# Patient Record
Sex: FEMALE | Race: BLACK OR AFRICAN AMERICAN | Employment: FULL TIME | URBAN - METROPOLITAN AREA
[De-identification: names, ages, dates, MRNs, and addresses within clinical notes are randomized per-mention and may not be internally consistent; named-entity substitution may affect disease eponyms.]

---

## 2017-09-06 ENCOUNTER — TRANSFERRED RECORDS (OUTPATIENT)
Dept: HEALTH INFORMATION MANAGEMENT | Facility: CLINIC | Age: 37
End: 2017-09-06

## 2019-12-04 ENCOUNTER — TRANSFERRED RECORDS (OUTPATIENT)
Dept: HEALTH INFORMATION MANAGEMENT | Facility: CLINIC | Age: 39
End: 2019-12-04

## 2019-12-11 ENCOUNTER — TRANSFERRED RECORDS (OUTPATIENT)
Dept: HEALTH INFORMATION MANAGEMENT | Facility: CLINIC | Age: 39
End: 2019-12-11

## 2019-12-11 ENCOUNTER — MEDICAL CORRESPONDENCE (OUTPATIENT)
Dept: HEALTH INFORMATION MANAGEMENT | Facility: CLINIC | Age: 39
End: 2019-12-11

## 2019-12-11 ENCOUNTER — TELEPHONE (OUTPATIENT)
Dept: OBGYN | Facility: CLINIC | Age: 39
End: 2019-12-11

## 2019-12-11 DIAGNOSIS — D64.9 SEVERE ANEMIA: Primary | ICD-10-CM

## 2019-12-11 RX ORDER — DOXYCYCLINE 100 MG/10ML
100 INJECTION, POWDER, LYOPHILIZED, FOR SOLUTION INTRAVENOUS
Status: CANCELLED | OUTPATIENT
Start: 2019-12-11

## 2019-12-11 NOTE — TELEPHONE ENCOUNTER
Received referral for D&E from [facility] 15w4d today. Pt [facility], recommended [facility] based [facility].    Checked insurance, covered benefit on MA, needs medical necessity statement.     Orders placed. 24 hour consent scanned to chart    Called patient to coordinate care. Informed if past 16 weeks will need 2 day procedure. Pt expressed understanding. Desires one day procedure if possible.     Called surgery scheduling. OR time on hold Friday 12/13 at 2:00 (Xiang).    ROuting to Maya to schedule

## 2019-12-12 ENCOUNTER — TELEPHONE (OUTPATIENT)
Dept: OBGYN | Facility: CLINIC | Age: 39
End: 2019-12-12

## 2019-12-12 ENCOUNTER — PREP FOR PROCEDURE (OUTPATIENT)
Dept: OBGYN | Facility: CLINIC | Age: 39
End: 2019-12-12

## 2019-12-12 DIAGNOSIS — Z51.89 ENCOUNTER FOR THERAPEUTIC PROCEDURE: Primary | ICD-10-CM

## 2019-12-12 PROBLEM — D64.9 SEVERE ANEMIA: Status: ACTIVE | Noted: 2019-12-12

## 2019-12-12 RX ORDER — MISOPROSTOL 200 UG/1
400 TABLET ORAL ONCE
Qty: 2 TABLET | Refills: 0 | Status: SHIPPED | OUTPATIENT
Start: 2019-12-12 | End: 2019-12-13

## 2019-12-13 ENCOUNTER — ANESTHESIA (OUTPATIENT)
Dept: SURGERY | Facility: CLINIC | Age: 39
End: 2019-12-13
Payer: COMMERCIAL

## 2019-12-13 ENCOUNTER — HOSPITAL ENCOUNTER (OUTPATIENT)
Facility: CLINIC | Age: 39
Discharge: HOME | End: 2019-12-13
Attending: OBSTETRICS & GYNECOLOGY | Admitting: OBSTETRICS & GYNECOLOGY
Payer: COMMERCIAL

## 2019-12-13 ENCOUNTER — ANESTHESIA EVENT (OUTPATIENT)
Dept: SURGERY | Facility: CLINIC | Age: 39
End: 2019-12-13
Payer: COMMERCIAL

## 2019-12-13 ENCOUNTER — MEDICAL CORRESPONDENCE (OUTPATIENT)
Dept: HEALTH INFORMATION MANAGEMENT | Facility: CLINIC | Age: 39
End: 2019-12-13

## 2019-12-13 VITALS
RESPIRATION RATE: 16 BRPM | BODY MASS INDEX: 25.05 KG/M2 | DIASTOLIC BLOOD PRESSURE: 78 MMHG | SYSTOLIC BLOOD PRESSURE: 123 MMHG | HEART RATE: 77 BPM | HEIGHT: 67 IN | OXYGEN SATURATION: 100 % | WEIGHT: 159.61 LBS | TEMPERATURE: 98.8 F

## 2019-12-13 DIAGNOSIS — D64.9 SEVERE ANEMIA: ICD-10-CM

## 2019-12-13 DIAGNOSIS — R10.2 PELVIC CRAMPING: Primary | ICD-10-CM

## 2019-12-13 PROBLEM — O20.0 THREATENED MISCARRIAGE: Status: ACTIVE | Noted: 2019-12-01

## 2019-12-13 LAB
ABO + RH BLD: NORMAL
ABO + RH BLD: NORMAL
BLD GP AB SCN SERPL QL: NORMAL
BLOOD BANK CMNT PATIENT-IMP: NORMAL
ERYTHROCYTE [DISTWIDTH] IN BLOOD BY AUTOMATED COUNT: 20.7 % (ref 10–15)
HCT VFR BLD AUTO: 31.2 % (ref 35–47)
HGB BLD-MCNC: 9.1 G/DL (ref 11.7–15.7)
MCH RBC QN AUTO: 20.6 PG (ref 26.5–33)
MCHC RBC AUTO-ENTMCNC: 29.2 G/DL (ref 31.5–36.5)
MCV RBC AUTO: 71 FL (ref 78–100)
PLATELET # BLD AUTO: 378 10E9/L (ref 150–450)
RBC # BLD AUTO: 4.41 10E12/L (ref 3.8–5.2)
SPECIMEN EXP DATE BLD: NORMAL
WBC # BLD AUTO: 9.6 10E9/L (ref 4–11)

## 2019-12-13 PROCEDURE — 36000051 ZZH SURGERY LEVEL 2 1ST 30 MIN - UMMC: Performed by: OBSTETRICS & GYNECOLOGY

## 2019-12-13 PROCEDURE — 25800030 ZZH RX IP 258 OP 636: Performed by: OBSTETRICS & GYNECOLOGY

## 2019-12-13 PROCEDURE — 25000128 H RX IP 250 OP 636: Performed by: NURSE ANESTHETIST, CERTIFIED REGISTERED

## 2019-12-13 PROCEDURE — 88305 TISSUE EXAM BY PATHOLOGIST: CPT | Performed by: OBSTETRICS & GYNECOLOGY

## 2019-12-13 PROCEDURE — 88305 TISSUE EXAM BY PATHOLOGIST: CPT | Mod: 26 | Performed by: OBSTETRICS & GYNECOLOGY

## 2019-12-13 PROCEDURE — 37000009 ZZH ANESTHESIA TECHNICAL FEE, EACH ADDTL 15 MIN: Performed by: OBSTETRICS & GYNECOLOGY

## 2019-12-13 PROCEDURE — 25000125 ZZHC RX 250: Performed by: OBSTETRICS & GYNECOLOGY

## 2019-12-13 PROCEDURE — 00000159 ZZHCL STATISTIC H-SEND OUTS PREP: Performed by: OBSTETRICS & GYNECOLOGY

## 2019-12-13 PROCEDURE — 86900 BLOOD TYPING SEROLOGIC ABO: CPT | Performed by: OBSTETRICS & GYNECOLOGY

## 2019-12-13 PROCEDURE — 85027 COMPLETE CBC AUTOMATED: CPT | Performed by: OBSTETRICS & GYNECOLOGY

## 2019-12-13 PROCEDURE — 86850 RBC ANTIBODY SCREEN: CPT | Performed by: OBSTETRICS & GYNECOLOGY

## 2019-12-13 PROCEDURE — 86901 BLOOD TYPING SEROLOGIC RH(D): CPT | Performed by: OBSTETRICS & GYNECOLOGY

## 2019-12-13 PROCEDURE — 25000128 H RX IP 250 OP 636: Performed by: OBSTETRICS & GYNECOLOGY

## 2019-12-13 PROCEDURE — 25000125 ZZHC RX 250: Performed by: NURSE ANESTHETIST, CERTIFIED REGISTERED

## 2019-12-13 PROCEDURE — 36000053 ZZH SURGERY LEVEL 2 EA 15 ADDTL MIN - UMMC: Performed by: OBSTETRICS & GYNECOLOGY

## 2019-12-13 PROCEDURE — 40000170 ZZH STATISTIC PRE-PROCEDURE ASSESSMENT II: Performed by: OBSTETRICS & GYNECOLOGY

## 2019-12-13 PROCEDURE — 71000014 ZZH RECOVERY PHASE 1 LEVEL 2 FIRST HR: Performed by: OBSTETRICS & GYNECOLOGY

## 2019-12-13 PROCEDURE — 37000008 ZZH ANESTHESIA TECHNICAL FEE, 1ST 30 MIN: Performed by: OBSTETRICS & GYNECOLOGY

## 2019-12-13 PROCEDURE — 25000132 ZZH RX MED GY IP 250 OP 250 PS 637: Performed by: ANESTHESIOLOGY

## 2019-12-13 PROCEDURE — 71000027 ZZH RECOVERY PHASE 2 EACH 15 MINS: Performed by: OBSTETRICS & GYNECOLOGY

## 2019-12-13 PROCEDURE — 36415 COLL VENOUS BLD VENIPUNCTURE: CPT | Performed by: OBSTETRICS & GYNECOLOGY

## 2019-12-13 PROCEDURE — 27210794 ZZH OR GENERAL SUPPLY STERILE: Performed by: OBSTETRICS & GYNECOLOGY

## 2019-12-13 PROCEDURE — 25800030 ZZH RX IP 258 OP 636: Performed by: NURSE ANESTHETIST, CERTIFIED REGISTERED

## 2019-12-13 RX ORDER — DEXAMETHASONE SODIUM PHOSPHATE 4 MG/ML
INJECTION, SOLUTION INTRA-ARTICULAR; INTRALESIONAL; INTRAMUSCULAR; INTRAVENOUS; SOFT TISSUE PRN
Status: DISCONTINUED | OUTPATIENT
Start: 2019-12-13 | End: 2019-12-13

## 2019-12-13 RX ORDER — LIDOCAINE HYDROCHLORIDE 20 MG/ML
INJECTION, SOLUTION INFILTRATION; PERINEURAL PRN
Status: DISCONTINUED | OUTPATIENT
Start: 2019-12-13 | End: 2019-12-13

## 2019-12-13 RX ORDER — SODIUM CHLORIDE, SODIUM LACTATE, POTASSIUM CHLORIDE, CALCIUM CHLORIDE 600; 310; 30; 20 MG/100ML; MG/100ML; MG/100ML; MG/100ML
INJECTION, SOLUTION INTRAVENOUS CONTINUOUS
Status: DISCONTINUED | OUTPATIENT
Start: 2019-12-13 | End: 2019-12-13 | Stop reason: HOSPADM

## 2019-12-13 RX ORDER — FENTANYL CITRATE 50 UG/ML
25-50 INJECTION, SOLUTION INTRAMUSCULAR; INTRAVENOUS
Status: DISCONTINUED | OUTPATIENT
Start: 2019-12-13 | End: 2019-12-13 | Stop reason: HOSPADM

## 2019-12-13 RX ORDER — ONDANSETRON 2 MG/ML
4 INJECTION INTRAMUSCULAR; INTRAVENOUS EVERY 30 MIN PRN
Status: DISCONTINUED | OUTPATIENT
Start: 2019-12-13 | End: 2019-12-13 | Stop reason: HOSPADM

## 2019-12-13 RX ORDER — ONDANSETRON 4 MG/1
4 TABLET, ORALLY DISINTEGRATING ORAL
Status: DISCONTINUED | OUTPATIENT
Start: 2019-12-13 | End: 2019-12-13 | Stop reason: HOSPADM

## 2019-12-13 RX ORDER — NALOXONE HYDROCHLORIDE 0.4 MG/ML
.1-.4 INJECTION, SOLUTION INTRAMUSCULAR; INTRAVENOUS; SUBCUTANEOUS
Status: DISCONTINUED | OUTPATIENT
Start: 2019-12-13 | End: 2019-12-13 | Stop reason: HOSPADM

## 2019-12-13 RX ORDER — ACETAMINOPHEN 325 MG/1
650 TABLET ORAL
Status: DISCONTINUED | OUTPATIENT
Start: 2019-12-13 | End: 2019-12-13 | Stop reason: HOSPADM

## 2019-12-13 RX ORDER — ALBUTEROL SULFATE 0.83 MG/ML
2.5 SOLUTION RESPIRATORY (INHALATION) EVERY 4 HOURS PRN
Status: DISCONTINUED | OUTPATIENT
Start: 2019-12-13 | End: 2019-12-13 | Stop reason: HOSPADM

## 2019-12-13 RX ORDER — FENTANYL CITRATE 50 UG/ML
INJECTION, SOLUTION INTRAMUSCULAR; INTRAVENOUS PRN
Status: DISCONTINUED | OUTPATIENT
Start: 2019-12-13 | End: 2019-12-13

## 2019-12-13 RX ORDER — ONDANSETRON 4 MG/1
4 TABLET, ORALLY DISINTEGRATING ORAL EVERY 30 MIN PRN
Status: DISCONTINUED | OUTPATIENT
Start: 2019-12-13 | End: 2019-12-13 | Stop reason: HOSPADM

## 2019-12-13 RX ORDER — ACETAMINOPHEN 325 MG/1
650 TABLET ORAL EVERY 4 HOURS PRN
Qty: 30 TABLET | Refills: 0 | Status: SHIPPED | OUTPATIENT
Start: 2019-12-13

## 2019-12-13 RX ORDER — LIDOCAINE HYDROCHLORIDE 10 MG/ML
INJECTION, SOLUTION INFILTRATION; PERINEURAL PRN
Status: DISCONTINUED | OUTPATIENT
Start: 2019-12-13 | End: 2019-12-13 | Stop reason: HOSPADM

## 2019-12-13 RX ORDER — ONDANSETRON 2 MG/ML
INJECTION INTRAMUSCULAR; INTRAVENOUS PRN
Status: DISCONTINUED | OUTPATIENT
Start: 2019-12-13 | End: 2019-12-13

## 2019-12-13 RX ORDER — PROPOFOL 10 MG/ML
INJECTION, EMULSION INTRAVENOUS CONTINUOUS PRN
Status: DISCONTINUED | OUTPATIENT
Start: 2019-12-13 | End: 2019-12-13

## 2019-12-13 RX ORDER — PROPOFOL 10 MG/ML
INJECTION, EMULSION INTRAVENOUS PRN
Status: DISCONTINUED | OUTPATIENT
Start: 2019-12-13 | End: 2019-12-13

## 2019-12-13 RX ORDER — OXYCODONE HYDROCHLORIDE 5 MG/1
5 TABLET ORAL
Status: DISCONTINUED | OUTPATIENT
Start: 2019-12-13 | End: 2019-12-13 | Stop reason: HOSPADM

## 2019-12-13 RX ORDER — MEPERIDINE HYDROCHLORIDE 25 MG/ML
12.5 INJECTION INTRAMUSCULAR; INTRAVENOUS; SUBCUTANEOUS
Status: DISCONTINUED | OUTPATIENT
Start: 2019-12-13 | End: 2019-12-13 | Stop reason: HOSPADM

## 2019-12-13 RX ORDER — DIMENHYDRINATE 50 MG/ML
25 INJECTION, SOLUTION INTRAMUSCULAR; INTRAVENOUS
Status: DISCONTINUED | OUTPATIENT
Start: 2019-12-13 | End: 2019-12-13 | Stop reason: HOSPADM

## 2019-12-13 RX ORDER — ACETAMINOPHEN 325 MG/1
975 TABLET ORAL ONCE
Status: COMPLETED | OUTPATIENT
Start: 2019-12-13 | End: 2019-12-13

## 2019-12-13 RX ORDER — HYDRALAZINE HYDROCHLORIDE 20 MG/ML
2.5-5 INJECTION INTRAMUSCULAR; INTRAVENOUS EVERY 10 MIN PRN
Status: DISCONTINUED | OUTPATIENT
Start: 2019-12-13 | End: 2019-12-13 | Stop reason: HOSPADM

## 2019-12-13 RX ORDER — DOXYCYCLINE 100 MG/10ML
100 INJECTION, POWDER, LYOPHILIZED, FOR SOLUTION INTRAVENOUS
Status: COMPLETED | OUTPATIENT
Start: 2019-12-13 | End: 2019-12-13

## 2019-12-13 RX ORDER — SODIUM CHLORIDE, SODIUM LACTATE, POTASSIUM CHLORIDE, CALCIUM CHLORIDE 600; 310; 30; 20 MG/100ML; MG/100ML; MG/100ML; MG/100ML
INJECTION, SOLUTION INTRAVENOUS CONTINUOUS PRN
Status: DISCONTINUED | OUTPATIENT
Start: 2019-12-13 | End: 2019-12-13

## 2019-12-13 RX ORDER — METOPROLOL TARTRATE 1 MG/ML
1-2 INJECTION, SOLUTION INTRAVENOUS EVERY 5 MIN PRN
Status: DISCONTINUED | OUTPATIENT
Start: 2019-12-13 | End: 2019-12-13 | Stop reason: HOSPADM

## 2019-12-13 RX ADMIN — ONDANSETRON 4 MG: 2 INJECTION INTRAMUSCULAR; INTRAVENOUS at 14:47

## 2019-12-13 RX ADMIN — FENTANYL CITRATE 50 MCG: 50 INJECTION, SOLUTION INTRAMUSCULAR; INTRAVENOUS at 14:35

## 2019-12-13 RX ADMIN — DEXAMETHASONE SODIUM PHOSPHATE 6 MG: 4 INJECTION, SOLUTION INTRAMUSCULAR; INTRAVENOUS at 14:28

## 2019-12-13 RX ADMIN — DOXYCYCLINE 100 MG: 100 INJECTION, POWDER, LYOPHILIZED, FOR SOLUTION INTRAVENOUS at 14:10

## 2019-12-13 RX ADMIN — SODIUM CHLORIDE, POTASSIUM CHLORIDE, SODIUM LACTATE AND CALCIUM CHLORIDE: 600; 310; 30; 20 INJECTION, SOLUTION INTRAVENOUS at 14:19

## 2019-12-13 RX ADMIN — PROPOFOL 150 MCG/KG/MIN: 10 INJECTION, EMULSION INTRAVENOUS at 14:24

## 2019-12-13 RX ADMIN — FENTANYL CITRATE 50 MCG: 50 INJECTION, SOLUTION INTRAMUSCULAR; INTRAVENOUS at 14:23

## 2019-12-13 RX ADMIN — LIDOCAINE HYDROCHLORIDE 80 MG: 20 INJECTION, SOLUTION INFILTRATION; PERINEURAL at 14:23

## 2019-12-13 RX ADMIN — PROPOFOL 50 MG: 10 INJECTION, EMULSION INTRAVENOUS at 14:23

## 2019-12-13 RX ADMIN — MIDAZOLAM 2 MG: 1 INJECTION INTRAMUSCULAR; INTRAVENOUS at 14:19

## 2019-12-13 RX ADMIN — ACETAMINOPHEN 975 MG: 325 TABLET, FILM COATED ORAL at 16:44

## 2019-12-13 ASSESSMENT — MIFFLIN-ST. JEOR: SCORE: 1436.63

## 2019-12-13 NOTE — DISCHARGE INSTRUCTIONS
Same-Day Surgery   Adult Discharge Orders & Instructions     For 24 hours after surgery:  1. Get plenty of rest.  A responsible adult must stay with you for at least 24 hours after you leave the hospital.   2. Pain medication can slow your reflexes. Do not drive or use heavy equipment.  If you have weakness or tingling, don't drive or use heavy equipment until this feeling goes away.  3. Mixing alcohol and pain medication can cause dizziness and slow your breathing. It can even be fatal. Do not drink alcohol while taking pain medication.  4. Avoid strenuous or risky activities.  Ask for help when climbing stairs.   5. You may feel lightheaded.  If so, sit for a few minutes before standing.  Have someone help you get up.   6. If you have nausea (feel sick to your stomach), drink only clear liquids such as apple juice, ginger ale, broth or 7-Up.  Rest may also help.  Be sure to drink enough fluids.  Move to a regular diet as you feel able. Take pain medications with a small amount of solid food, such as toast or crackers, to avoid nausea.   7. A slight fever is normal. Call the doctor if your fever is over 100 F (37.7 C) (taken under the tongue) or lasts longer than 24 hours.  8. You may have a dry mouth, muscle aches, trouble sleeping or a sore throat.  These symptoms should go away after 24 hours.  9. Do not make important or legal decisions.   Pain Management:      1. Take pain medication (if prescribed) for pain as directed by your physician.        2. WARNING: If the pain medication you have been prescribed contains Tylenol  (acetaminophen), DO NOT take additional doses of Tylenol (acetaminophen).     Call your doctor for any of the followin.  Signs of infection (fever, growing tenderness at the surgery site, severe pain, a large amount of drainage or bleeding, foul-smelling drainage, redness, swelling).    2.  It has been over 8 to 10 hours since surgery and you are still not able to urinate (pee).    3.   Headache for over 24 hours.    4.  Numbness, tingling or weakness the day after surgery (if you had spinal anesthesia).  To contact a doctor, call _____________________________________ or:      [phone] and ask for the Resident On Call for:          ____obstetrics/ OB_____________________ (answered 24 hours a day)      Emergency Department:  [facility]: [phone]  [facility]: [phone]               Rev. 10/2014   Discharge Instructions: Following a Dilation   and Curettage/Dilation and Evacuation    What to expect:    Expect small to moderate amount of vaginal bleeding which should taper off in 4-5 days. It should not be heavier than your regular menstrual flow.    Do not douche, and use a pad rather than tampons.     No intercourse until bleeding has ceased.    Activity:    Rest the day of surgery. You may resume normal activity the next day.    You may bathe or shower.    Avoid heavy lifting (10-15 lbs) for one week.    Comfort:    The amount of discomfort you can expect is very unpredictable. If you have pain that cannot be controlled with non-aspirin pain relievers or with the prescription you may have received, you should notify your doctor.    Abdominal cramping (like menstrual cramps) or low back ache are common and should not be a cause for concern. You will be drowsy and weak the day of surgery and possibly the following day.    Diet:    You have no restrictions on your diet. Following surgery, drink plenty of fluids and eat a light meal.    Nausea:    The anesthesia medications you received during your surgical procedure may produce some nausea.    If you feel nauseated, stay in bed, keep your head down and try drinking fluids such as Seven-Up, tea or soup.    Notify Physician at once if you experience:    A fever over 100 degrees (a low grade fever under 100 degrees is usual after surgery).    Heavy flow and/or passing large clots. Saturating more than 1  pad per hour for 2 or more hours.     Severe pain or cramps.  Rev. 5/12

## 2019-12-13 NOTE — ANESTHESIA CARE TRANSFER NOTE
Patient: Stephanie Bro    Procedure(s):  EXAM UNDER ANESTHESIA, PELVIS  DILATION AND EVACUATION, UTERUS, exam under anesthesia    Diagnosis: Severe anemia [D64.9]  Diagnosis Additional Information: No value filed.    Anesthesia Type:   MAC     Note:  Airway :Face Mask  Patient transferred to:PACU  Handoff Report: Identifed the Patient, Identified the Reponsible Provider, Reviewed the pertinent medical history, Discussed the surgical course, Reviewed Intra-OP anesthesia mangement and issues during anesthesia, Set expectations for post-procedure period and Allowed opportunity for questions and acknowledgement of understanding      Vitals: (Last set prior to Anesthesia Care Transfer)    CRNA VITALS  12/13/2019 1445 - 12/13/2019 1524      12/13/2019             Resp Rate (observed):  14                Electronically Signed By: Antionette Patton CRNA, APRN NATHANAEL  December 13, 2019  3:24 PM

## 2019-12-13 NOTE — OR NURSING
"CN and oncoming nurse notified of patient questioning herself why she did this? And not wanting to go home with \"Juan.\"  Patient is tearful and feeling guilty, stating,\"will ever be forgiven.\"    Patient transferred to private room with door for privacy.  "

## 2019-12-13 NOTE — ANESTHESIA POSTPROCEDURE EVALUATION
Anesthesia POST Procedure Evaluation    Patient: Stephanie Bro   MRN:     [id number] Gender:   female   Age:    39 year old :      1980        Preoperative Diagnosis: Severe anemia [D64.9]   Procedure(s):  EXAM UNDER ANESTHESIA, PELVIS  DILATION AND EVACUATION, UTERUS, exam under anesthesia   Postop Comments: No value filed.       Anesthesia Type:  Not documented  MAC    Reportable Event: NO     PAIN: Uncomplicated   Sign Out status: Comfortable, Well controlled pain     PONV: No PONV   Sign Out status:  No Nausea or Vomiting     Neuro/Psych: Uneventful perioperative course   Sign Out Status: Preoperative baseline; Age appropriate mentation     Airway/Resp.: Uneventful perioperative course   Sign Out Status: Non labored breathing, age appropriate RR; Resp. Status within EXPECTED Parameters     CV: Uneventful perioperative course   Sign Out status: Appropriate BP and perfusion indices; Appropriate HR/Rhythm     Disposition:   Sign Out in:  PACU  Disposition:  Phase II; Home  Recovery Course: Uneventful  Follow-Up: Not required           Last Anesthesia Record Vitals:  CRNA VITALS  2019 1445 - 2019 1545      2019             Resp Rate (observed):  14          Last PACU Vitals:  No vitals data found for the desired time range.        Electronically Signed By: Mita Moran MD, 2019, 3:45 PM

## 2019-12-13 NOTE — OR NURSING
Discussed that Chris will be with patient for the next 24 hours. She feels safe going home with Chris. Patient states does not remember saying she did not want to see Chris.     Dr. Norton in to see patient. Ok to discharge home when patient ready.

## 2019-12-13 NOTE — OR NURSING
Called and spoke with Dr. Norton, notified that patient had a half of a pad of bleeding, then about 30 minutes later had another quarter of a pad of bleeding. It was when patient was up for the first time and up walking to the bathroom. Stated OK to discharge home. Advise patient to return/call if having bleeding of more than one pad an hour for 2 hours.     Patient updated and verbalized understanding.

## 2019-12-13 NOTE — ANESTHESIA PREPROCEDURE EVALUATION
Anesthesia Pre-Procedure Evaluation    Patient: Stephanie Bro   MRN:     [id number] Gender:   female   Age:    39 year old :      1980        Preoperative Diagnosis: Severe anemia [D64.9]   Procedure(s):  EXAM UNDER ANESTHESIA, PELVIS  DILATION AND EVACUATION, UTERUS, exam under anesthesia     Past Medical History:   Diagnosis Date     Anemia, iron deficiency       Past Surgical History:   Procedure Laterality Date     CERCLAGE CERVICAL        SECTION        SECTION            Anesthesia Evaluation     . Pt has had prior anesthetic. Type: Regional    No history of anesthetic complications          ROS/MED HX    ENT/Pulmonary:  - neg pulmonary ROS     Neurologic:  - neg neurologic ROS     Cardiovascular:  - neg cardiovascular ROS       METS/Exercise Tolerance:  >4 METS   Hematologic:  - neg hematologic  ROS       Musculoskeletal:  - neg musculoskeletal ROS       GI/Hepatic:  - neg GI/hepatic ROS       Renal/Genitourinary:  - ROS Renal section negative       Endo:  - neg endo ROS       Psychiatric:  - neg psychiatric ROS       Infectious Disease:  - neg infectious disease ROS       Malignancy:         Other: Comment: 15w2d elective ab   (+) Possibly pregnant                        PHYSICAL EXAM:   Mental Status/Neuro: A/A/O   Airway: Facies: Feasible  Mallampati: I  Mouth/Opening: Full  TM distance: > 6 cm  Neck ROM: Full   Respiratory: Auscultation: CTAB     Resp. Rate: Normal     Resp. Effort: Normal      CV: Rhythm: Regular  Rate: Age appropriate  Heart: Normal Sounds  Edema: None   Comments:      Dental: Normal Dentition                LABS:  CBC:   Lab Results   Component Value Date    WBC 9.6 2019    HGB 9.1 (L) 2019    HCT 31.2 (L) 2019     2019     BMP: No results found for: NA, POTASSIUM, CHLORIDE, CO2, BUN, CR, GLC  COAGS: No results found for: PTT, INR, FIBR  POC: No results found for: BGM, HCG, HCGS  OTHER: No results found for: PH,  "LACT, A1C, DEVON, PHOS, MAG, ALBUMIN, PROTTOTAL, ALT, AST, GGT, ALKPHOS, BILITOTAL, BILIDIRECT, LIPASE, AMYLASE, SHIKHA, TSH, T4, T3, CRP, SED     Preop Vitals    BP Readings from Last 3 Encounters:   12/13/19 127/81    Pulse Readings from Last 3 Encounters:   12/13/19 74      Resp Readings from Last 3 Encounters:   12/13/19 16    SpO2 Readings from Last 3 Encounters:   12/13/19 100%      Temp Readings from Last 1 Encounters:   12/13/19 37  C (98.6  F)    Ht Readings from Last 1 Encounters:   12/13/19 1.702 m (5' 7\")      Wt Readings from Last 1 Encounters:   12/13/19 72.4 kg (159 lb 9.8 oz)    Estimated body mass index is 25 kg/m  as calculated from the following:    Height as of this encounter: 1.702 m (5' 7\").    Weight as of this encounter: 72.4 kg (159 lb 9.8 oz).     LDA:        Assessment:   ASA SCORE: 1    H&P: History and physical reviewed and following examination; no interval change.   Smoking Status:  Non-Smoker/Unknown   NPO Status: NPO Appropriate     Plan:   Anes. Type:  MAC   Pre-Medication: None   Induction:  N/a   Airway: Native Airway   Access/Monitoring: PIV   Maintenance: Propofol Sedation     Postop Plan:   Postop Pain: None  Postop Sedation/Airway: Not planned     PONV Management:   Adult Risk Factors: Female, Non-Smoker   Prevention:, Propofol     CONSENT: Direct conversation   Plan and risks discussed with: Patient                      Mita Moran MD  "

## 2019-12-13 NOTE — PROGRESS NOTES
"Attestation:     I, Harleen Cazares, [facility] staff , have reviewed and edited the following  student s note on 12/15/2019 at 1:12 AM.           SPIRITUAL HEALTH SERVICES  SPIRITUAL ASSESSMENT Progress Note  [facility] ([facility]) PACU   ON-CALL VISIT    REFERRAL SOURCE: Page from nurse for emotional support.    Jovanna shared her feelings of guilt and her wondering if she could \"be forgiven\" post D&E.   I reflectively listened as Jovanna shared how \"conflicted\" she was regarding giving up \"my child.\"    We discussed \"God is ...\" and Jovanna filled in the word \"understanding\".  We discussed God being understanding and also God being \"love\" and \"forgiveness\".      Jovanna said she has been \"praying about it\" and asked me to pray with her.  We prayed that Jovanna would accept understanding, love and forgiveness from God.     Jovanna also shared that she was \"carrying this burden alone\" and that it would be healthy for her to share it with trusted people in her life.      PLAN: No follow up possible at this time as Jovanna is out patient.     Jake Gilmore  Chaplain Resident  Pager [phone]  "

## 2019-12-13 NOTE — OR NURSING
"Updated Dr. Moran about patient reporting feeling very sad/upset. Stating she is \"not sure she should have done this.\"      called per patient request.   "

## 2019-12-13 NOTE — H&P
"Gynecology H&P    HPI:  Stephanie Bro is a 39 year old  who presents today for termination of pregnancy at 15w4d by 13w6d US. She is feeling well today. Denies shortness of breath, chest pain, fever. This pregnancy is complicated by a large subchorionic hemorrhage and chronic anemia. She was admitted for vaginal bleeding and received a blood transfusion at the beginning of December. She also had bleeding at the time of her CS with twins and received a blood transfusion at that time.       PMH:  Past Medical History:   Diagnosis Date     Anemia, iron deficiency      PSHx:  Past Surgical History:   Procedure Laterality Date     CERCLAGE CERVICAL        SECTION        SECTION       Medications:  None    Allergies:     Allergies   Allergen Reactions     Aspirin Hives     Methergine [Methylergonovine] Hives     Nsaids Hives     Physical Exam:   Vitals:    19 1155   BP: 127/81   Pulse: 74   Resp: 16   Temp: 98.6  F (37  C)   SpO2: 100%   Weight: 72.4 kg (159 lb 9.8 oz)   Height: 1.702 m (5' 7\")      Gen:  Tearful, otherwise well appearing  CV: RRR, no m/r/g  Pulm: CTAB, no increased work of breathing  Abd: non-tender, non-distended    Labs:  Hgb 9.1  Plt 378  O positive    A&P:  Jovanna is a 38 yo at 15w4d who presents for pregnancy termination. She has chronic anemia with a history of vaginal bleeding requiring a blood transfusion this pregnancy. She is agreeable to a blood transfusion if needed during the procedure. She is otherwise healthy and is cleared for surgery today.     - Allergy to methergine, will use hemabate and misoprostol if hemorrhage    Kellie Norton MD  Ob/Gyn, PGY4    "

## 2019-12-13 NOTE — OP NOTE
[facility]  Operative Note    Name: Stephanie Bro  MRN: [id number]  : 1980  Date of surgery: 2019    Preoperative diagnosis:  - Iron deficiency anemia  -  at 15w4d    Postoperative diagnosis:  - S/p procedure below     Procedure:  - Exam under anesthesia, dilation and extraction     Surgeon: Alma Otoole MD  Resident: Kellie Norton MD, PGY4    Anesthesia: MAC with local   EBL: 30 mL   Urine output: 100 mL drained at the start of the procedure  Fluids: 700 mL crystalloid    Specimens: Products of conception   Complications: None apparent    Findings: Left sided bartholin cyst. Large stool burden. Normal appearing cervix. All fetal parts accounted for at conclusion of procedure.     Indications: Stephanie Bro is a 40 yo  at 15w4d who desired pregnancy termination. [facility] from [facility] due to anemia. This pregnancy was complicated by heavy vaginal bleeding and a large subchorionic hemorrhage requiring a blood transfusion. Women's Right to Know paperwork was signed on 19. The risks and benefits of procedure were reviewed with patient, all questions were answered and written informed consent was obtained.     Procedure:   The patient was taken to the OR where MAC was begun. She was placed in dorsal lithotomy position using Vamsi stirrups. Bimanual exam was performed.  She was prepped and draped in the usual sterile fashion and her bladder was drained with a straight cath. A surgical time-out was then performed.     A speculum was placed in the vagina and a ring forceps was placed on the anterior cervical lip. A paracervical block was placed at 4 & 8 o'clock with 20cc 1% lidocaine with dilute vasopressin. The cervix was serially dilated to 47F with Ward dilators. A 12 mm rigid curved curette was inserted into the uterus and suction applied to rupture the amniotic sac and drain the amniotic fluid. Under ultrasound guidance, the fetus and placenta were  removed with multiple passes of the Sopher and ring forceps. The suction cannula was then reinserted and used with to fully empty the uterus. The uterus contracted at the culmination of the procedure and a gritty texture was appreciated circumferentially. All instruments were removed from patient's vagina and minimal bleeding was noted at the conclusion of the case. A bimanual exam revealed good uterine tone.     The tissue was inspected. Calvarium, thorax and four extremities were accounted for and the procedure was determined to be complete.     The patient tolerated the procedure well. She was taken to the recovery room awake and in stable condition. She received IV doxycycline for perioperative prophylaxis.      Ultrasound guidance: Continuous realtime ultrasound guidance was performed through the entire procedure, showing no evidence of uterine trauma or retained tissue. A thin endometrial stripe was seen at the conclusion of the case. No complications or abnormalities were revealed by ultrasound.    Dr. Otoole was present for the procedure.     Kellie Norton MD  Ob/Gyn, PGY4

## 2019-12-18 LAB — COPATH REPORT: NORMAL

## 2019-12-22 NOTE — H&P
"[facility]  Gynecology History and Physical    SUBJECTIVE    Stephanie Bro is a 39 year old  at 15w5d who is here for termination of pregnancy. She is sure of her decision.    This pregnancy is c/b subchorionic hemorrhage requiring maternal transfusion because of pre-existing anemia. She was admitted for this treatment on 19.    PAST MEDICAL HISTORY  Past Medical History:   Diagnosis Date     Anemia, iron deficiency      MEDICATIONS  Current Facility-Administered Medications   Medication     doxycycline (VIBRAMYCIN) 100 mg vial to attach to  mL bag     Provider ordered ALTERNATE pre op antibiotic.       ALLERGIES  Allergies   Allergen Reactions     Methergine [Methylergonovine] Hives     Nsaids Hives       SOCIAL HISTORY  Social History     Tobacco Use     Smoking status: Never Smoker     Smokeless tobacco: Never Used   Substance Use Topics     Alcohol use: Not on file     Drug use: Not on file       FAMILY HISTORY  No family history on file.    REVIEW OF SYSTEMS  A 10 point review of systems including Constitutional, Eyes, Respiratory, Cardiovascular, Gastroenterology, Genitourinary, Integumentary, Musculoskeletal, and Psychiatric, were all negative, except for pertinent positives noted in the above HPI.    OBJECTIVE  /81   Pulse 74   Temp 98.6  F (37  C)   Resp 16   Ht 1.702 m (5' 7\")   Wt 72.4 kg (159 lb 9.8 oz)   LMP 2019 (Within Weeks)   SpO2 100%   BMI 25.00 kg/m      General: Alert, without distress  HEENT: normocephalic, without obvious abnormality   Cardiovascular: regular rate and rhythm, no murmurs/rubs/gallops   Lungs: clear to auscultation bilaterally   Abdomen: soft, non-tender, non-distended, normal bowel sounds   Pelvic: deferred until procedure   Extremities: normal    LABS:  CBC 19 (per Care Everywhere):  7.0>7.0/24.8<281; repeat Hgb after transfusion 8.1  Blood Type 19 (per Care Everywhere): O+  Antibody 19 (per Care " Everywhere): Neg    IMAGING:  Pelvic Ultrasound 19 (per Care Everywhere):  EXAM: US OB < 14 WEEKS  LOCATION: [facility]  DATE/TIME: 2019 12:14 AM    INDICATION: vaginal bleeding  COMPARISON: None.  TECHNIQUE: Transabdominal scans were performed.    FINDINGS:  UTERUS: Single normal appearing intrauterine gestation sac.  CRL: measures 7.7 mm, equals 13 weeks, 6 days.  FETAL HEART RATE: 156 bpm.  AMNIOTIC FLUID: Normal.  PLACENTA: Anterior placenta.. Heterogeneous hypoechoic crescentic collection anterior to the placenta noted measuring 4.7 x 2.6 x 3.1 cm. No associated vascularity.    RIGHT OVARY: Nonvisualized due to overlying gas  LEFT OVARY: Unremarkable  CERVIX: Closed. 3.1 cm.   Other Result Information   Interface, Rad Results In - 2019 12:22 AM CST  EXAM: US OB < 14 WEEKS  LOCATION: [facility]  DATE/TIME: 2019 12:14 AM    INDICATION: vaginal bleeding  COMPARISON: None.  TECHNIQUE: Transabdominal scans were performed.    FINDINGS:  UTERUS: Single normal appearing intrauterine gestation sac.  CRL: measures 7.7 mm, equals 13 weeks, 6 days.  FETAL HEART RATE: 156 bpm.  AMNIOTIC FLUID: Normal.  PLACENTA: Anterior placenta.. Heterogeneous hypoechoic crescentic collection anterior to the placenta noted measuring 4.7 x 2.6 x 3.1 cm. No associated vascularity.    RIGHT OVARY: Nonvisualized due to overlying gas  LEFT OVARY: Unremarkable  CERVIX: Closed. 3.1 cm.    IMPRESSION:   1.  Single living intrauterine gestation at 13 weeks 6 days, EDC 2020.  2.  Moderately large subchorionic hemorrhage. Follow-up advised       ASSESSMENT  Stephanie Bro is a 39 year old  at 15w5d here for dilation and evacuation for termination of pregnancy.     PLAN  -discussed risks of procedure, including bleeding requiring transfusion, infection, DVT, uterine perforation requiring repair/reoperation for repair.   -discussed return to fertility; Jovanna does not desire pregnancy but wishes to  defer discussion of contraception today  -given allergies, will plan for hemabate and misoprostol if she has bleeding during/after procedure  -she is Rh POS and does not require rhogam today    RTC in 2wks for postop visit.    Alma Otoole MD, MSCI    [facility]/OBGYN       Likely COPD exacerbation vs pneumonia vs ACS vs bronchitis. Will check labs, trops, ekg, chest xray, RVP and give nebs, pain meds, prednisone and admit.

## (undated) DEVICE — LINEN GOWN X4 5410

## (undated) DEVICE — SUCTION CANNULA UTERINE 12MM CVD  21555

## (undated) DEVICE — LINEN TOWEL PACK X5 5464

## (undated) DEVICE — PAD CHUX UNDERPAD 30X36" P3036C

## (undated) DEVICE — GLOVE PROTEXIS BLUE W/NEU-THERA 7.0  2D73EB70

## (undated) DEVICE — NDL 18GA 1.5" 305196

## (undated) DEVICE — TUBING SUCTION VACUUM COLLECTION 6FT 610

## (undated) DEVICE — GOWN XLG DISP 9545

## (undated) DEVICE — SOL WATER IRRIG 1000ML BOTTLE 2F7114

## (undated) DEVICE — JELLY LUBRICATING SURGILUBE 2OZ TUBE 0281-0205-02

## (undated) DEVICE — SYR 01ML TBC SLIP TIP W/O NDL

## (undated) DEVICE — Device

## (undated) DEVICE — SOL NACL 0.9% IRRIG 1000ML BOTTLE 2F7124

## (undated) DEVICE — SUCTION VACUUM CANISTER STANDARD W/LID&CAPS 003987-901

## (undated) DEVICE — GLOVE PROTEXIS W/NEU-THERA 6.5  2D73TE65

## (undated) RX ORDER — DOXYCYCLINE 100 MG/10ML
INJECTION, POWDER, LYOPHILIZED, FOR SOLUTION INTRAVENOUS
Status: DISPENSED
Start: 2019-12-13

## (undated) RX ORDER — LIDOCAINE HYDROCHLORIDE 10 MG/ML
INJECTION, SOLUTION EPIDURAL; INFILTRATION; INTRACAUDAL; PERINEURAL
Status: DISPENSED
Start: 2019-12-13

## (undated) RX ORDER — KETOROLAC TROMETHAMINE 30 MG/ML
INJECTION, SOLUTION INTRAMUSCULAR; INTRAVENOUS
Status: DISPENSED
Start: 2019-12-13

## (undated) RX ORDER — ACETAMINOPHEN 325 MG/1
TABLET ORAL
Status: DISPENSED
Start: 2019-12-13

## (undated) RX ORDER — EPHEDRINE SULFATE 50 MG/ML
INJECTION, SOLUTION INTRAMUSCULAR; INTRAVENOUS; SUBCUTANEOUS
Status: DISPENSED
Start: 2019-12-13

## (undated) RX ORDER — FENTANYL CITRATE 50 UG/ML
INJECTION, SOLUTION INTRAMUSCULAR; INTRAVENOUS
Status: DISPENSED
Start: 2019-12-13

## (undated) RX ORDER — VASOPRESSIN 20 U/ML
INJECTION PARENTERAL
Status: DISPENSED
Start: 2019-12-13

## (undated) RX ORDER — METHYLERGONOVINE MALEATE 0.2 MG/ML
INJECTION INTRAVENOUS
Status: DISPENSED
Start: 2019-12-13